# Patient Record
Sex: FEMALE | Race: WHITE | HISPANIC OR LATINO | URBAN - METROPOLITAN AREA
[De-identification: names, ages, dates, MRNs, and addresses within clinical notes are randomized per-mention and may not be internally consistent; named-entity substitution may affect disease eponyms.]

---

## 2021-11-03 ENCOUNTER — EMERGENCY (EMERGENCY)
Facility: HOSPITAL | Age: 51
LOS: 1 days | Discharge: HOME | End: 2021-11-03
Attending: EMERGENCY MEDICINE
Payer: COMMERCIAL

## 2021-11-03 VITALS
TEMPERATURE: 98 F | WEIGHT: 139.99 LBS | SYSTOLIC BLOOD PRESSURE: 113 MMHG | RESPIRATION RATE: 17 BRPM | OXYGEN SATURATION: 98 % | HEART RATE: 83 BPM | DIASTOLIC BLOOD PRESSURE: 71 MMHG

## 2021-11-03 LAB
ALBUMIN SERPL ELPH-MCNC: 4.4 G/DL — SIGNIFICANT CHANGE UP (ref 3.3–5.2)
ALP SERPL-CCNC: 65 U/L — SIGNIFICANT CHANGE UP (ref 40–120)
ALT FLD-CCNC: 24 U/L — SIGNIFICANT CHANGE UP
ANION GAP SERPL CALC-SCNC: 13 MMOL/L — SIGNIFICANT CHANGE UP (ref 5–17)
AST SERPL-CCNC: 18 U/L — SIGNIFICANT CHANGE UP
BASOPHILS # BLD AUTO: 0.04 K/UL — SIGNIFICANT CHANGE UP (ref 0–0.2)
BASOPHILS NFR BLD AUTO: 0.5 % — SIGNIFICANT CHANGE UP (ref 0–2)
BILIRUB SERPL-MCNC: 0.4 MG/DL — SIGNIFICANT CHANGE UP (ref 0.4–2)
BUN SERPL-MCNC: 12.4 MG/DL — SIGNIFICANT CHANGE UP (ref 8–20)
CALCIUM SERPL-MCNC: 8.9 MG/DL — SIGNIFICANT CHANGE UP (ref 8.6–10.2)
CHLORIDE SERPL-SCNC: 102 MMOL/L — SIGNIFICANT CHANGE UP (ref 98–107)
CO2 SERPL-SCNC: 23 MMOL/L — SIGNIFICANT CHANGE UP (ref 22–29)
CREAT SERPL-MCNC: 0.49 MG/DL — LOW (ref 0.5–1.3)
EOSINOPHIL # BLD AUTO: 0 K/UL — SIGNIFICANT CHANGE UP (ref 0–0.5)
EOSINOPHIL NFR BLD AUTO: 0 % — SIGNIFICANT CHANGE UP (ref 0–6)
GLUCOSE SERPL-MCNC: 118 MG/DL — HIGH (ref 70–99)
HCT VFR BLD CALC: 39.4 % — SIGNIFICANT CHANGE UP (ref 34.5–45)
HGB BLD-MCNC: 13.4 G/DL — SIGNIFICANT CHANGE UP (ref 11.5–15.5)
IMM GRANULOCYTES NFR BLD AUTO: 0.2 % — SIGNIFICANT CHANGE UP (ref 0–1.5)
LYMPHOCYTES # BLD AUTO: 0.74 K/UL — LOW (ref 1–3.3)
LYMPHOCYTES # BLD AUTO: 9.1 % — LOW (ref 13–44)
MCHC RBC-ENTMCNC: 30.2 PG — SIGNIFICANT CHANGE UP (ref 27–34)
MCHC RBC-ENTMCNC: 34 GM/DL — SIGNIFICANT CHANGE UP (ref 32–36)
MCV RBC AUTO: 88.9 FL — SIGNIFICANT CHANGE UP (ref 80–100)
MONOCYTES # BLD AUTO: 0.44 K/UL — SIGNIFICANT CHANGE UP (ref 0–0.9)
MONOCYTES NFR BLD AUTO: 5.4 % — SIGNIFICANT CHANGE UP (ref 2–14)
NEUTROPHILS # BLD AUTO: 6.86 K/UL — SIGNIFICANT CHANGE UP (ref 1.8–7.4)
NEUTROPHILS NFR BLD AUTO: 84.8 % — HIGH (ref 43–77)
PLATELET # BLD AUTO: 197 K/UL — SIGNIFICANT CHANGE UP (ref 150–400)
POTASSIUM SERPL-MCNC: 4.4 MMOL/L — SIGNIFICANT CHANGE UP (ref 3.5–5.3)
POTASSIUM SERPL-SCNC: 4.4 MMOL/L — SIGNIFICANT CHANGE UP (ref 3.5–5.3)
PROT SERPL-MCNC: 7.1 G/DL — SIGNIFICANT CHANGE UP (ref 6.6–8.7)
RBC # BLD: 4.43 M/UL — SIGNIFICANT CHANGE UP (ref 3.8–5.2)
RBC # FLD: 12.8 % — SIGNIFICANT CHANGE UP (ref 10.3–14.5)
SODIUM SERPL-SCNC: 138 MMOL/L — SIGNIFICANT CHANGE UP (ref 135–145)
WBC # BLD: 8.1 K/UL — SIGNIFICANT CHANGE UP (ref 3.8–10.5)
WBC # FLD AUTO: 8.1 K/UL — SIGNIFICANT CHANGE UP (ref 3.8–10.5)

## 2021-11-03 PROCEDURE — 80053 COMPREHEN METABOLIC PANEL: CPT

## 2021-11-03 PROCEDURE — G1004: CPT

## 2021-11-03 PROCEDURE — 70450 CT HEAD/BRAIN W/O DYE: CPT | Mod: MG

## 2021-11-03 PROCEDURE — 85025 COMPLETE CBC W/AUTO DIFF WBC: CPT

## 2021-11-03 PROCEDURE — 93010 ELECTROCARDIOGRAM REPORT: CPT

## 2021-11-03 PROCEDURE — 36415 COLL VENOUS BLD VENIPUNCTURE: CPT

## 2021-11-03 PROCEDURE — 99284 EMERGENCY DEPT VISIT MOD MDM: CPT | Mod: 25

## 2021-11-03 PROCEDURE — 93005 ELECTROCARDIOGRAM TRACING: CPT

## 2021-11-03 PROCEDURE — 70450 CT HEAD/BRAIN W/O DYE: CPT | Mod: 26,MG

## 2021-11-03 PROCEDURE — 99285 EMERGENCY DEPT VISIT HI MDM: CPT

## 2021-11-03 RX ORDER — MECLIZINE HCL 12.5 MG
25 TABLET ORAL ONCE
Refills: 0 | Status: COMPLETED | OUTPATIENT
Start: 2021-11-03 | End: 2021-11-03

## 2021-11-03 RX ORDER — MECLIZINE HCL 12.5 MG
1 TABLET ORAL
Qty: 14 | Refills: 0
Start: 2021-11-03 | End: 2021-11-09

## 2021-11-03 RX ADMIN — Medication 25 MILLIGRAM(S): at 10:27

## 2021-11-03 NOTE — ED STATDOCS - CARE PROVIDER_API CALL
Jonnie Gentile; PhD)  Neurology; Vascular Neurology  [address] [place] [address]  Phone: [phone]  Fax: [phone]  Follow Up Time: Routine

## 2021-11-03 NOTE — ED STATDOCS - PHYSICAL EXAMINATION
Gen: NAD, AOx3  Head: NCAT  HEENT: PERRL, (+) Leftward horizontal fatigable nystagmus; oral mucosa moist, normal conjunctiva, oropharynx clear without exudate or erythema  Lung: CTAB, no respiratory distress, no wheezing, rales, rhonchi  CV: normal s1/s2, rrr, no murmurs, Normal perfusion, pulses 2+ throughout  Abd: soft, NTND, no CVA tenderness  MSK: No edema, no visible deformities, full range of motion in all 4 extremities  Neuro: CN II-XII grossly intact, No focal neurologic deficits, normal gait, no pronator drift  Skin: No rash   Psych: normal affect

## 2021-11-03 NOTE — ED STATDOCS - PATIENT PORTAL LINK FT
You can access the FollowMyHealth Patient Portal offered by [facility] by registering at the following website: [email or web address]. By joining [employer]’s FollowMyHealth portal, you will also be able to view your health information using other applications (apps) compatible with our system.

## 2021-11-03 NOTE — ED ADULT TRIAGE NOTE - CHIEF COMPLAINT QUOTE
Patient states that she has been having dizziness since 3am. Pt states that the dizziness is brought on by movement. Pt denies any vision changes. Pt states that she has had this in the past and was given medication but she does not have any at home.

## 2021-11-03 NOTE — ED STATDOCS - OBJECTIVE STATEMENT
50 y/o female with no PMHx presents to ED c/o dizziness. Patient reports at 3am, everything was spinning, she was dizzy and unable to walk, with associated nausea. Dizziness is persistent, worse with movement.     Denies numbness, tingling, allergies, recent head trauma, ringing in the ears, double vision, vision changes  : Crescencio 50 y/o female with no PMHx presents to ED c/o intermittent dizziness. Patient reports at 3am, everything was spinning, she was dizzy and unable to walk, with associated nausea. Dizziness is persistent, worse with movement.     Denies numbness, tingling, allergies, recent head trauma, ringing in the ears, double vision, vision changes  : Crescencio

## 2021-11-03 NOTE — ED STATDOCS - PROGRESS NOTE DETAILS
Pt moved form intake Room. Pt seen and evaluated by intake Physician. HPI, Physical examination performed by intake Physician . Note reviewed and followup examination performed by me consistent with initial assessment. Agrees with intake Physician plan and tests. Pt Labs and Head Ct are within acceptable limits. Pt made aware of her result with the help of an . Pt will F/U with neurology and continue with Meclizine.

## 2021-11-03 NOTE — ED ADULT NURSE NOTE - OBJECTIVE STATEMENT
Pt A&Ox4, NAD. Pt with complaints of dizziness since 3am. Pt denies hx of vertigo. Breathing even and unlabored.

## 2021-11-03 NOTE — ED STATDOCS - ATTENDING CONTRIBUTION TO CARE
I, Courtney Luna, performed a face to face bedside interview with this patient regarding history of present illness, review of symptoms and relevant past medical, social and family history.  I completed an independent physical examination. Medical decision making, follow-up on ordered tests (ie labs, radiologic studies) and re-evaluation of the patient's status has been communicated to the ACP.  Disposition of the patient will be based on test outcome and response to ED interventions.

## 2021-11-03 NOTE — ED STATDOCS - TEMPLATE, MLM
The Service to orthopedics order in workqueue [id number] requested on 7/14/2020 has been canceled as, unable to contact. Ordering provider has been notified.    Please contact patient, if further communication is needed.         General

## 2021-11-03 NOTE — ED STATDOCS - CLINICAL SUMMARY MEDICAL DECISION MAKING FREE TEXT BOX
Patient likely with BPPV, no signs of ataxia on exam, horizontal nystagmus present. Will check CT head, labs, given Meclozine, and re-assess. If unremarkable work up, will D/C home with Meclozine. Patient likely with BPPV, no signs of ataxia on exam, horizontal nystagmus present which is fatigable and suggests peripheral cause of vertigo. Will check CT head, labs, given Meclozine, and re-assess. If unremarkable work up, will D/C home with Meclizine.

## 2021-12-31 ENCOUNTER — EMERGENCY (EMERGENCY)
Facility: HOSPITAL | Age: 51
LOS: 1 days | Discharge: HOME | End: 2021-12-31
Attending: EMERGENCY MEDICINE
Payer: COMMERCIAL

## 2021-12-31 VITALS
OXYGEN SATURATION: 98 % | HEART RATE: 70 BPM | TEMPERATURE: 99 F | HEIGHT: 67 IN | RESPIRATION RATE: 18 BRPM | SYSTOLIC BLOOD PRESSURE: 152 MMHG | WEIGHT: 160.06 LBS | DIASTOLIC BLOOD PRESSURE: 93 MMHG

## 2021-12-31 NOTE — ED PROVIDER NOTE - PATIENT PORTAL LINK FT
You can access the FollowMyHealth Patient Portal offered by [facility] by registering at the following website: [email or web address]. By joining [employer]’s FollowMyHealth portal, you will also be able to view your health information using other applications (apps) compatible with our system.

## 2021-12-31 NOTE — ED ADULT TRIAGE NOTE - CHIEF COMPLAINT QUOTE
BIBA,  @[facility], s/p fall, c/o left knee and left arm pain, abrasion to Left knee, denied LOC or headache,

## 2021-12-31 NOTE — ED PROCEDURE NOTE - GENERAL PROCEDURE DETAILS
Cleaned wound with normal saline, copious irrigation, applied bacitracin and placed clean, dry dressing

## 2021-12-31 NOTE — ED ADULT NURSE NOTE - OBJECTIVE STATEMENT
States she slipped and fell today , c/o left knee and left arm pain .Abrasions below left knee cleansed .

## 2021-12-31 NOTE — ED PROVIDER NOTE - OBJECTIVE STATEMENT
pt works at [employer] and slipped an fell on left knee and hand now with abrasion on left knee and pain to left 3rd finger

## 2021-12-31 NOTE — ED PROVIDER NOTE - NSFOLLOWUPINSTRUCTIONS_ED_ALL_ED_FT
Abrasion    WHAT YOU NEED TO KNOW:    An abrasion is a wound on your skin. Abrasions usually happen when your skin rubs against a rough surface. Examples of an abrasion include rug burn, a skinned elbow, or road rash. Abrasions can be deep or shallow The wound may hurt, bleed, bruise, or swell.    DISCHARGE INSTRUCTIONS:    Return to the emergency department if:   •The bleeding does not stop after 10 minutes of firm pressure.      •The redness around your wound begins to spread.      •You cannot rinse one or more foreign objects out of your wound.      Call your doctor if:   •You have a fever or chills.       •Your abrasion is red, warm, swollen, or draining pus.      •You have questions or concerns about your condition or care.      Care for your abrasion:   •Wash your hands and dry them with a clean towel before first.      •Press a clean cloth against your wound for 5 to 10 minutes to stop any bleeding.      •Rinse your wound with clean water. Do not use harsh soap, alcohol, or iodine solutions.      •Use a clean, wet cloth to remove any objects, such as small pieces of rocks or dirt.      •Rub antibiotic ointment on your wound. This may help prevent infection and help your wound heal.      •Cover the wound with a non-stick bandage. Change the bandage daily, and if it gets wet or dirty.       Follow up with your doctor as directed: Write down your questions so you remember to ask them during your visits.        © Copyright [employer] 2021

## 2022-05-11 PROBLEM — Z78.9 OTHER SPECIFIED HEALTH STATUS: Chronic | Status: ACTIVE | Noted: 2021-12-31

## 2022-05-13 PROBLEM — Z00.00 ENCOUNTER FOR PREVENTIVE HEALTH EXAMINATION: Status: ACTIVE | Noted: 2022-05-13

## 2022-05-19 ENCOUNTER — FORM ENCOUNTER (OUTPATIENT)
Age: 52
End: 2022-05-19

## 2022-05-20 ENCOUNTER — APPOINTMENT (OUTPATIENT)
Dept: MRI IMAGING | Facility: CLINIC | Age: 52
End: 2022-05-20
Payer: OTHER MISCELLANEOUS

## 2022-05-20 ENCOUNTER — APPOINTMENT (OUTPATIENT)
Dept: ORTHOPEDIC SURGERY | Facility: CLINIC | Age: 52
End: 2022-05-20
Payer: OTHER MISCELLANEOUS

## 2022-05-20 VITALS — WEIGHT: 165 LBS | HEIGHT: 67 IN | BODY MASS INDEX: 25.9 KG/M2

## 2022-05-20 DIAGNOSIS — Z78.9 OTHER SPECIFIED HEALTH STATUS: ICD-10-CM

## 2022-05-20 RX ORDER — MELOXICAM 15 MG/1
15 TABLET ORAL
Qty: 30 | Refills: 0 | Status: ACTIVE | COMMUNITY
Start: 2022-05-20 | End: 1900-01-01

## 2022-05-20 NOTE — PHYSICAL EXAM
[de-identified] : L MF \par Swelling\par Tender PIP\par Stiffness PIP and DIP\par +instaibility\par Numbness \par \par Xray report neg

## 2022-05-20 NOTE — HISTORY OF PRESENT ILLNESS
[Work related] : work related [10] : 10 [6] : 6 [Dull/Aching] : dull/aching [Constant] : constant [Ice] : ice [Not working due to injury] : Work status: not working due to injury [de-identified] : She fell on L hand at work 12/31/21\par She has pain and swelling MF [] : no [FreeTextEntry1] : L HAND [FreeTextEntry3] : 12/31/21 [FreeTextEntry5] : FELL AND JAMMED FINGER [de-identified] : ACTIVITY [de-identified] : 12/31/21 [de-identified] : LORELEI WILSON MD FEW DAYS LATER [de-identified] : XR [de-identified] : [employer]

## 2022-05-22 ENCOUNTER — TRANSCRIPTION ENCOUNTER (OUTPATIENT)
Age: 52
End: 2022-05-22

## 2022-05-24 ENCOUNTER — APPOINTMENT (OUTPATIENT)
Dept: ORTHOPEDIC SURGERY | Facility: CLINIC | Age: 52
End: 2022-05-24
Payer: OTHER MISCELLANEOUS

## 2022-05-24 VITALS — HEIGHT: 67 IN | BODY MASS INDEX: 25.9 KG/M2 | WEIGHT: 165 LBS

## 2022-05-24 PROBLEM — Z00.00 ENCOUNTER FOR PREVENTIVE HEALTH EXAMINATION: Status: ACTIVE | Noted: 2022-05-24

## 2022-05-24 NOTE — PHYSICAL EXAM
[de-identified] : L MF \par Swelling\par Tender PIP\par Stiffness PIP and DIP\par +instaibility\par Numbness \par \par Xray report neg

## 2022-05-24 NOTE — ASSESSMENT
[FreeTextEntry1] : Small joint injection was performed because of pain inflammation and stiffness\par Anesthesia: ethyl chloride sprayed topically\par Celestone: An injection of Celestone 1cc\par Marcaine: An injection of Marcaine 0.5% 1cc\par \par Patient has tried OTC's including aspirin, Ibuprofen, Aleve etc or prescription NSAIDS, and/or exercises at home and/ or\par physical therapy without satisfactory response.\par After verbal consent using sterile preparation and technique. The risks, benefits, and alternatives to cortisone injection\par were explained in full to the patient. Risks outlined include but are not limited to infection, sepsis, bleeding, scarring, skin\par discoloration, temporary increase in pain, syncopal episode, failure to resolve symptoms, allergic reaction, symptom\par recurrence, and elevation of blood sugar in diabetics. Patient understood the risks. All questions were answered. After\par discussion of options, patient requested an injection. Oral informed consent was obtained and sterile prep was done of the\par injection site. Sterile technique was utilized for the procedure including the preparation of the solutions used for the\par injection. Patient tolerated the procedure well. Advised to ice the injection site this evening.\par Prep with betadine locally to site. Sterile technique used

## 2022-05-24 NOTE — HISTORY OF PRESENT ILLNESS
[Work related] : work related [8] : 8 [7] : 7 [Dull/Aching] : dull/aching [Not working due to injury] : Work status: not working due to injury [de-identified] : L MF MRI shows sprain, inflammation\par \par Still has pain  [FreeTextEntry1] : l hand [FreeTextEntry3] : 1/31/21 [de-identified] : waiting on [facility] auth

## 2022-06-21 ENCOUNTER — APPOINTMENT (OUTPATIENT)
Dept: ORTHOPEDIC SURGERY | Facility: CLINIC | Age: 52
End: 2022-06-21
Payer: OTHER MISCELLANEOUS

## 2022-06-21 VITALS — WEIGHT: 165 LBS | BODY MASS INDEX: 25.9 KG/M2 | HEIGHT: 67 IN

## 2022-06-21 NOTE — PHYSICAL EXAM
[de-identified] : L MF \par Swelling\par Tender PIP\par Stiffness PIP and DIP\par +instaibility\par Numbness \par \par Xray report neg

## 2022-06-21 NOTE — HISTORY OF PRESENT ILLNESS
[Work related] : work related [7] : 7 [5] : 5 [Dull/Aching] : dull/aching [Not working due to injury] : Work status: not working due to injury [] : yes [de-identified] : L MF much beter from PIP injecitn  [FreeTextEntry1] : Left Middle Finger  [FreeTextEntry3] : 1/31/21

## 2022-08-09 ENCOUNTER — APPOINTMENT (OUTPATIENT)
Dept: ORTHOPEDIC SURGERY | Facility: CLINIC | Age: 52
End: 2022-08-09

## 2022-08-09 VITALS — WEIGHT: 165 LBS | BODY MASS INDEX: 25.9 KG/M2 | HEIGHT: 67 IN

## 2022-08-09 DIAGNOSIS — S63.633A SPRAIN OF INTERPHALANGEAL JOINT OF LEFT MIDDLE FINGER, INITIAL ENCOUNTER: ICD-10-CM

## 2022-08-09 NOTE — PHYSICAL EXAM
[de-identified] : L MF \par Swelling\par Tender PIP\par Stiffness PIP and DIP\par No instability\par Numbness \par

## 2022-08-09 NOTE — HISTORY OF PRESENT ILLNESS
[Work related] : work related [7] : 7 [5] : 5 [Dull/Aching] : dull/aching [Tingling] : tingling [Not working due to injury] : Work status: not working due to injury [] : yes [de-identified] : L MF still has pain and swelling  [FreeTextEntry1] : Left Middle Finger  [FreeTextEntry3] : 1/31/21 [FreeTextEntry5] : pain is the same\par  [FreeTextEntry6] : numbness  [de-identified] : ice

## 2022-09-06 ENCOUNTER — APPOINTMENT (OUTPATIENT)
Dept: ORTHOPEDIC SURGERY | Facility: CLINIC | Age: 52
End: 2022-09-06

## 2023-06-08 ENCOUNTER — APPOINTMENT (OUTPATIENT)
Dept: ORTHOPEDIC SURGERY | Facility: CLINIC | Age: 53
End: 2023-06-08
Payer: OTHER MISCELLANEOUS

## 2023-06-08 VITALS — WEIGHT: 134 LBS | HEIGHT: 67 IN | BODY MASS INDEX: 21.03 KG/M2

## 2023-06-08 DIAGNOSIS — F17.200 NICOTINE DEPENDENCE, UNSPECIFIED, UNCOMPLICATED: ICD-10-CM

## 2023-06-08 NOTE — HISTORY OF PRESENT ILLNESS
[Work related] : work related [6] : 6 [9] : 9 [Dull/Aching] : dull/aching [Tightness] : tightness [Exercising] : exercising [Stairs] : stairs [de-identified] : WC 12/31/21\par \par 06/08/23: 51 y/o female c/o left knee pain following an injury at work on 12/31/21. She slipped on a ramp and fell onto her knee. She has been treated with another orthopedist, who took x-rays and treated with PT without relief. Recently, she has been experiencing increased intermittent swelling. Sharp pain in the medial and lateral knee. Feels unstable. She has been soaking in Epsom salts and using ice/heat. Denies history of prior injury. 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

## 2023-06-08 NOTE — ASSESSMENT
[FreeTextEntry1] : 06/08/23: X-rays today -4 views, left knee, negative. \par Due to locking symptoms, suggest MRI of the left knee to r/o MMT. \par Obtain MRI left knee r/o MMT. \par Activity modifier as tolerated.\par OTC Aleve encouraged, but she prefers to avoid. \par Questions addressed.\par \par The documentation recorded by the scribe accurately reflects the service I personally performed and the decisions made by me.\par I, Kiki Quiñonez, attest that this documentation has been prepared under the direction and in the presence of Provider Darrell Evans MD.\par \par The patient was seen by Darrell Evans MD.\par The following radiographs were ordered and read by me during this patient's visit. I reviewed each radiograph in detail with the patient, and discussed the findings as highlighted below.\par \par

## 2023-06-08 NOTE — WORK
[Sprain/Strain] : sprain/strain [Was the competent medical cause of the injury] : was the competent medical cause of the injury [Are consistent with the injury] : are consistent with the injury [Consistent with my objective findings] : consistent with my objective findings [Partial] : partial [Does not reveal pre-existing condition(s) that may affect treatment/prognosis] : does not reveal pre-existing condition(s) that may affect treatment/prognosis [Unknown at this time] : : unknown at this time [Patient] : patient [FreeTextEntry1] : fair\par The patient is currently working without limitation

## 2023-06-08 NOTE — PHYSICAL EXAM
[Left] : left knee [NL (0)] : extension 0 degrees [5___] : hamstring 5[unfilled]/5 [de-identified] : The patient is a well appearing 52 year year old female of their stated age.\par Patient ambulates with a normal gait.\par \par Neurologic: normal coordination, normal sensation, normal mood and affect, orientated and able to communicate. \par Skin: normal skin. \par Constitutional: well developed and well nourished.\par  [] : no erythema [TWNoteComboBox7] : flexion 130 degrees

## 2023-06-13 ENCOUNTER — FORM ENCOUNTER (OUTPATIENT)
Age: 53
End: 2023-06-13

## 2023-06-14 ENCOUNTER — APPOINTMENT (OUTPATIENT)
Dept: MRI IMAGING | Facility: CLINIC | Age: 53
End: 2023-06-14
Payer: OTHER MISCELLANEOUS

## 2023-06-14 ENCOUNTER — APPOINTMENT (OUTPATIENT)
Dept: MRI IMAGING | Facility: CLINIC | Age: 53
End: 2023-06-14

## 2023-06-22 ENCOUNTER — APPOINTMENT (OUTPATIENT)
Dept: ORTHOPEDIC SURGERY | Facility: CLINIC | Age: 53
End: 2023-06-22
Payer: OTHER MISCELLANEOUS

## 2023-06-22 VITALS — WEIGHT: 134 LBS | HEIGHT: 67 IN | BODY MASS INDEX: 21.03 KG/M2

## 2023-06-22 NOTE — PROCEDURE
[FreeTextEntry3] : \par A Large joint injection was performed of the [LEFT KNEE]. The indication for this procedure was pain and inflammation, and patient had decreased mobility in the joint. Risks, benefits and alternatives to a steroid injection procedure were discussed; Risks outlined include but are not limited to infection, sepsis, bleeding, scarring, skin discoloration, temporary increase in pain, syncopal episode, failure to resolve symptoms, allergic reaction, symptom recurrence, and elevation of blood sugar in diabetics.. All questions were answered to the patient's apparent satisfaction and informed consent obtained. Prior to injection a 'Time Out' was conducted in accordance with [place] policy and the site and nature of procedure verified with the patient. \par  \par Procedure: The area of injection was prepared in a sterile fashion. The injection and aspiration was carried out utilizing sterile technique. The site was prepped with alcohol, betadine, ethyl chloride sprayed topically and sterile technique used.\par  \par ( X ) 1cc of Celestone(30mg/ml) \par ( X ) 2cc of 1% Lidocaine \par \par Patient tolerated the procedure well and direct pressure was applied for hemostasis. The patient was reminded of potential post-injection risks including, but not limited to, delayed hypersensitivity reactions and/or infection. Ice tonight to the injection site.\par

## 2023-06-22 NOTE — HISTORY OF PRESENT ILLNESS
[10] : 10 [8] : 8 [Dull/Aching] : dull/aching [Radiating] : radiating [Sharp] : sharp [Shooting] : shooting [Frequent] : frequent [Leisure] : leisure [Work] : work [Meds] : meds [Standing] : standing [Walking] : walking [Stairs] : stairs [de-identified] : WC 12/31/21\par \par 06/22/2023: continued left knee pain and swelling. Tried Aleve did not work \par \par 06/08/23: 53 y/o female c/o left knee pain following an injury at work on 12/31/21. She slipped on a ramp and fell onto her knee. She has been treated with another orthopedist, who took x-rays and treated with PT without relief. Recently, she has been experiencing increased intermittent swelling. Sharp pain in the medial and lateral knee. Feels unstable. She has been soaking in Epsom salts and using ice/heat. Denies history of prior injury. 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 Evans [de-identified] : MRI [de-identified] : Patient takes ibuprofen daily to help with the pain.

## 2023-06-22 NOTE — DATA REVIEWED
[FreeTextEntry1] : MRI left knee 6/14/23\par 1. Medial meniscal degeneration without tear.\par 2. Chondral loss in the medial and patellofemoral compartments with medial joint space narrowing and slight patellofemoral effusion/synovitis.\par 3. No acute fracture, ligament tear, or loose body.

## 2023-06-22 NOTE — ASSESSMENT
[FreeTextEntry1] : 06/08/23: X-rays today -4 views, left knee, negative. \par Due to locking symptoms, suggest MRI of the left knee to r/o MMT. \par Obtain MRI left knee r/o MMT. \par Activity modifier as tolerated.\par OTC Aleve encouraged, but she prefers to avoid. \par Questions addressed.\par \par 06/22/2023: MRI reviewed and discussed.\par MRI left knee reveals chondral loss medial and patella compartment\par No indication for surgery \par Start PT and HEP to improve mechanics and reduce pain. \par Activity modifier as tolerated. \par CSI left knee offered and Patient elected to proceed with steroid injection left knee \par Apply ice to affected area.  Questions addressed.\par RTO 6 wks \par \par The documentation recorded by the scribe accurately reflects the service I personally performed and the decisions made by me.\par I, Alberta Quiñonezibpavel, attest that this documentation has been prepared under the direction and in the presence of Provider Darrell Evans MD.\par \par The patient was seen by Darrell Evans MD.\par \par  \par \par \par

## 2023-07-18 ENCOUNTER — APPOINTMENT (OUTPATIENT)
Dept: ORTHOPEDIC SURGERY | Facility: CLINIC | Age: 53
End: 2023-07-18
Payer: OTHER MISCELLANEOUS

## 2023-07-18 DIAGNOSIS — M17.12 UNILATERAL PRIMARY OSTEOARTHRITIS, LEFT KNEE: ICD-10-CM

## 2023-07-18 DIAGNOSIS — M23.92 UNSPECIFIED INTERNAL DERANGEMENT OF LEFT KNEE: ICD-10-CM

## 2023-07-18 NOTE — DATA REVIEWED
As noted below, GFR is typically around 50, will see what these labs show later tonight or tomorrow.  Patient is aware to avoid nonsteroidals other than Tylenol if able, and to keep hydrated.   [FreeTextEntry1] : Impression: left knee\par 1. Medial meniscal degeneration without tear.\par 2. Chondral loss in the medial and patellofemoral compartments with medial joint space narrowing and slight \par patellofemoral effusion/synovitis.\par 3. No acute fracture, ligament tear, or loose body.\par E-signed by Deangelo Baez MD 06/15/2023

## 2023-07-18 NOTE — HISTORY OF PRESENT ILLNESS
[de-identified] : WC 12/31/21\par \par 07/18/2023: Here for follow up left knee. CSI injection preformed last visit with one day relief. Now frequents aleve. She recalls swelling two saturdays ago. 4 days ago recalls sharp pain anteriorly while ascending the stairs. She has occasional swelling. She is icing.\par \par 06/22/2023: continued left knee pain and swelling. Tried Aleve did not work \par \par 06/08/23: 53 y/o female c/o left knee pain following an injury at work on 12/31/21. She slipped on a ramp and fell onto her knee. She has been treated with another orthopedist, who took x-rays and treated with PT without relief. Recently, she has been experiencing increased intermittent swelling. Sharp pain in the medial and lateral knee. Feels unstable. She has been soaking in Epsom salts and using ice/heat. Denies history of prior injury. She return to work at full duty in 9/2022.\par \par Occupation: gait agent

## 2023-07-18 NOTE — ASSESSMENT
[FreeTextEntry1] : 06/08/23: X-rays today -4 views, left knee, negative. \par Due to locking symptoms, suggest MRI of the left knee to r/o MMT. \par Obtain MRI left knee r/o MMT. \par Activity modifier as tolerated.\par OTC Aleve encouraged, but she prefers to avoid. \par Questions addressed.\par \par 06/22/2023: MRI reviewed and discussed.\par MRI left knee reveals chondral loss medial and patella compartment\par No indication for surgery \par Start PT and HEP to improve mechanics and reduce pain. \par CSI left knee offered and Patient elected to proceed with steroid injection left knee \par \par 07/18/2023: failed relief from steroid injection. \par No PT as of yet. \par Start physical therapy to improve mechanics and reduce pain.\par This is a formal request for 12-15 sessions of PT for increasing ROM, improving strength/mechanics, and decreasing pain.\par Recommend and request auth for L knee visco series.\par Questions answered. \par \par The documentation recorded by the scribe accurately reflects the service I personally performed and the decisions made by me.\par I, Alberta Quiñonezibe, attest that this documentation has been prepared under the direction and in the presence of Provider Darrell vEans MD.\par \par The patient was seen by Darrell Evans MD.\par \par \par  \par \par \par

## 2023-07-19 ENCOUNTER — APPOINTMENT (OUTPATIENT)
Dept: ORTHOPEDIC SURGERY | Facility: CLINIC | Age: 53
End: 2023-07-19
Payer: OTHER MISCELLANEOUS

## 2023-07-19 VITALS — HEIGHT: 67 IN | BODY MASS INDEX: 21.03 KG/M2 | WEIGHT: 134 LBS

## 2023-07-19 DIAGNOSIS — M54.50 LOW BACK PAIN, UNSPECIFIED: ICD-10-CM

## 2023-07-19 DIAGNOSIS — S39.012A STRAIN OF MUSCLE, FASCIA AND TENDON OF LOWER BACK, INITIAL ENCOUNTER: ICD-10-CM

## 2023-07-19 RX ORDER — CYCLOBENZAPRINE HYDROCHLORIDE 5 MG/1
5 TABLET, FILM COATED ORAL
Qty: 14 | Refills: 0 | Status: ACTIVE | COMMUNITY
Start: 2023-07-19 | End: 1900-01-01

## 2023-07-24 NOTE — ASSESSMENT
[FreeTextEntry1] : This is a 52 year old female with acute lumbar strain after work related accident.  Lumbar x-rays are normal today no fracture or instability seen. This patient will undergo PT directed at her lumbar spine, start Flexeril 5 mg qhs for spasm. FU in 2 months to reassess the need for MRI.  \par \par Explained intended effects, potential side effects, and schedule of dosages of the medication.  Discussed red flag signs and symptoms of worsening condition, when to call the office, and when to seek higher level of care.\par \par Prior to appointment and during encounter with patient extensive medical records were reviewed including but not limited to, hospital records, outpatient records, imaging results, and lab data. During this appointment the patient was examined, diagnoses were discussed and explained in a face to face manner. In addition extensive time was spent reviewing aforementioned diagnostic studies. Counseling including abnormal image results, differential diagnoses, treatment options, risk and benefits, lifestyle changes, current condition, and current medications was performed. Patient's comments, questions, and concerns were addressed and patient verbalized understanding. Based on this patient's presentation at our office, which is an orthopedic spine surgeon's office, this patient inherently / intrinsically has a risk, however minute, of developing issues such as Cauda equina syndrome, bowel and bladder changes, or progression of motor or neurological deficits such as paralysis which may be permanent.\par \par

## 2023-07-24 NOTE — HISTORY OF PRESENT ILLNESS
[Work related] : work related [Sudden] : sudden [4] : 4 [0] : 0 [Radiating] : radiating [Sharp] : sharp [Shooting] : shooting [Tightness] : tightness [Frequent] : frequent [Rest] : rest [Heat] : heat [Sitting] : sitting [Standing] : standing [Bending forward] : bending forward [Stairs] : stairs [Full time] : Work status: full time [de-identified] : 07/19/2023 -Patient presents to the office for initial evaluation of lower back pain. She suffered lower back pain after a work related injury. Patient is an [employer] . She explained she was opening a door to a plane and the pressure from the cabin released causing her to fall backward she did not hit the ground but she was still holding onto the handle of the door and immediately felt back pain. Patient reports having neck and lower back pain prior to this issue. She did not seek ED or UC tx.  Patient reports staying out of work for seven days which has helped along with Aleve and heat/ice application. Patient reports the pain is now intermittent with occasional radiculopathy down the left posterior leg to the knee. She is also getting treatment from Dr. Evans regarding left knee pain.  Patient denies fevers, acute weakness, unexpected weight loss, urinary incontinence, and bowel incontinence.\par \par \par Subjective Weakness:No \par Numbness/Tingling: yes\par Bladder/Bowel dysfunction: no\par Gait Abnormalities: no\par Fine motor coordination changes:no\par \par WORKERS COMP INFO \par WC DOI: 06/10/2023\par Occupation: [employer] \par Working status: FT\par   \par \par  \par Injections: Yes left knee CSI, LESI, CAMRON in the past  \par \par Pertinent Surgical History: N/A \par \par \par  [] : no [FreeTextEntry3] : 6/10/23 [FreeTextEntry5] : was opening a door on a 737, the  gave the ok to open the door, tried to open the door a couple of times then the door went to open & felt pain & was unable to move [FreeTextEntry6] : pinching, pressure [FreeTextEntry7] : lt buttock/leg/knee [FreeTextEntry9] : aleve [de-identified] : 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

## 2023-07-24 NOTE — PHYSICAL EXAM
[No bony abnormalities] : No bony abnormalities [No spinal deformity, fracture, lytic lesion, or marked single level collapse] : No spinal deformity, fracture, lytic lesion, or marked single level collapse [No instability seen on flexion/extension] : No instability seen on flexion/extension [de-identified] : Constitutional:  \par - No acute distress  \par - Well developed; well nourished  \par   \par Neurological:  \par - normal mood and affect  \par - alert and oriented x 3   \par   \par Cardiovascular:  \par - grossly normal\par   \par \par Lumbar Spine Exam: \par \par Inspection: \par erythema (-) \par ecchymosis (-) \par rashes (-) \par alignment: no scoliosis \par   \par Palpation: \par Midline lumbar tenderness:             (+) \par midline thoracic tenderness:          (-) \par Lumbar paraspinal tenderness:  	L (+) ; R (+) \par thoracic paraspinal tenderness:	L (-) ; R (-) \par sciatic nerve tenderness :         	L (+) ; R (+) \par SI joint tenderness:                    	L (-) ; R (-) \par GTB tenderness:                        	L (-);  R (-) \par   \par ROM:   full\par pain with extension \par   \par Strength: \par                                	Right   	Left    \par Hip Flexion:                (5/5)       (5/5) \par Quadriceps:               (5/5)       (5/5) \par Hamstrings:                (5/5)       (5/5) \par Ankle Dorsiflexion:    (5/5)       (5/5) \par EHL:                            (5/5)       (5/5) \par Ankle Plantarflexion:  (5/5)       (5/5) \par   \par Special Tests: \par SLR:                        	R (-) ; L (-) \par Facet loading:        	R (-) ; L (+) \par DYANA test:            	R (-) ; L (-) \par Hamstring tightness:  R (-);  L (-) \par   \par Neurologic: \par SILT throughout right lower extremity \par SILT throughout left lower extremity \par   \par Reflexes normal and symmetric bilateral lower extremities \par   \par Gait: \par non- antalgic gait \par ambulates without assistive device \par \par

## 2023-08-03 ENCOUNTER — APPOINTMENT (OUTPATIENT)
Dept: ORTHOPEDIC SURGERY | Facility: CLINIC | Age: 53
End: 2023-08-03

## 2023-08-30 ENCOUNTER — APPOINTMENT (OUTPATIENT)
Dept: ORTHOPEDIC SURGERY | Facility: CLINIC | Age: 53
End: 2023-08-30